# Patient Record
Sex: FEMALE | Race: WHITE | NOT HISPANIC OR LATINO | Employment: FULL TIME | URBAN - METROPOLITAN AREA
[De-identification: names, ages, dates, MRNs, and addresses within clinical notes are randomized per-mention and may not be internally consistent; named-entity substitution may affect disease eponyms.]

---

## 2017-02-08 ENCOUNTER — TRANSCRIBE ORDERS (OUTPATIENT)
Dept: ADMINISTRATIVE | Facility: HOSPITAL | Age: 47
End: 2017-02-08

## 2017-02-08 DIAGNOSIS — Z12.31 VISIT FOR SCREENING MAMMOGRAM: Primary | ICD-10-CM

## 2017-02-24 ENCOUNTER — HOSPITAL ENCOUNTER (OUTPATIENT)
Dept: MAMMOGRAPHY | Facility: HOSPITAL | Age: 47
Discharge: HOME | End: 2017-02-24
Admitting: NURSE PRACTITIONER

## 2017-02-24 DIAGNOSIS — Z12.31 VISIT FOR SCREENING MAMMOGRAM: ICD-10-CM

## 2017-02-24 PROCEDURE — 77063 BREAST TOMOSYNTHESIS BI: CPT

## 2017-02-24 PROCEDURE — 77063 BREAST TOMOSYNTHESIS BI: CPT | Performed by: RADIOLOGY

## 2017-02-24 PROCEDURE — G0202 SCR MAMMO BI INCL CAD: HCPCS

## 2017-02-24 PROCEDURE — 77067 SCR MAMMO BI INCL CAD: CPT | Performed by: RADIOLOGY

## 2017-03-10 ENCOUNTER — HOSPITAL ENCOUNTER (OUTPATIENT)
Dept: MAMMOGRAPHY | Facility: HOSPITAL | Age: 47
Discharge: HOME | End: 2017-03-10
Admitting: NURSE PRACTITIONER

## 2017-03-10 DIAGNOSIS — R92.8 ABNORMAL MAMMOGRAM: ICD-10-CM

## 2017-03-10 PROCEDURE — G0279 TOMOSYNTHESIS, MAMMO: HCPCS

## 2017-03-10 PROCEDURE — 77065 DX MAMMO INCL CAD UNI: CPT | Performed by: RADIOLOGY

## 2017-03-10 PROCEDURE — 77061 BREAST TOMOSYNTHESIS UNI: CPT | Performed by: RADIOLOGY

## 2017-03-10 PROCEDURE — G0206 DX MAMMO INCL CAD UNI: HCPCS

## 2017-03-17 ENCOUNTER — TRANSCRIBE ORDERS (OUTPATIENT)
Dept: MAMMOGRAPHY | Facility: HOSPITAL | Age: 47
End: 2017-03-17

## 2017-03-17 DIAGNOSIS — R92.8 ABNORMAL MAMMOGRAM: Primary | ICD-10-CM

## 2017-09-08 ENCOUNTER — APPOINTMENT (OUTPATIENT)
Dept: MAMMOGRAPHY | Facility: HOSPITAL | Age: 47
End: 2017-09-08

## 2017-09-08 ENCOUNTER — HOSPITAL ENCOUNTER (OUTPATIENT)
Dept: MAMMOGRAPHY | Facility: HOSPITAL | Age: 47
Discharge: HOME | End: 2017-09-08
Admitting: NURSE PRACTITIONER

## 2017-09-08 DIAGNOSIS — R92.8 ABNORMAL MAMMOGRAM: ICD-10-CM

## 2017-09-08 PROCEDURE — 77065 DX MAMMO INCL CAD UNI: CPT | Performed by: RADIOLOGY

## 2017-09-08 PROCEDURE — G0206 DX MAMMO INCL CAD UNI: HCPCS

## 2019-03-13 ENCOUNTER — TRANSCRIBE ORDERS (OUTPATIENT)
Dept: ADMINISTRATIVE | Facility: HOSPITAL | Age: 49
End: 2019-03-13

## 2019-03-13 DIAGNOSIS — Z12.31 VISIT FOR SCREENING MAMMOGRAM: Primary | ICD-10-CM

## 2019-04-25 ENCOUNTER — HOSPITAL ENCOUNTER (OUTPATIENT)
Dept: MAMMOGRAPHY | Facility: HOSPITAL | Age: 49
Discharge: HOME | End: 2019-04-25
Admitting: NURSE PRACTITIONER

## 2019-04-25 DIAGNOSIS — Z12.31 VISIT FOR SCREENING MAMMOGRAM: ICD-10-CM

## 2019-04-25 PROCEDURE — 77067 SCR MAMMO BI INCL CAD: CPT

## 2019-04-25 PROCEDURE — 77063 BREAST TOMOSYNTHESIS BI: CPT | Performed by: RADIOLOGY

## 2019-04-25 PROCEDURE — 77067 SCR MAMMO BI INCL CAD: CPT | Performed by: RADIOLOGY

## 2019-04-25 PROCEDURE — 77063 BREAST TOMOSYNTHESIS BI: CPT

## 2020-06-09 ENCOUNTER — TRANSCRIBE ORDERS (OUTPATIENT)
Dept: ADMINISTRATIVE | Facility: HOSPITAL | Age: 50
End: 2020-06-09

## 2020-06-09 DIAGNOSIS — Z12.31 VISIT FOR SCREENING MAMMOGRAM: Primary | ICD-10-CM

## 2020-08-04 ENCOUNTER — HOSPITAL ENCOUNTER (OUTPATIENT)
Dept: MAMMOGRAPHY | Facility: HOSPITAL | Age: 50
Discharge: HOME | End: 2020-08-04
Admitting: NURSE PRACTITIONER

## 2020-08-04 DIAGNOSIS — Z12.31 VISIT FOR SCREENING MAMMOGRAM: ICD-10-CM

## 2020-08-04 PROCEDURE — 77063 BREAST TOMOSYNTHESIS BI: CPT

## 2020-08-04 PROCEDURE — 77063 BREAST TOMOSYNTHESIS BI: CPT | Performed by: RADIOLOGY

## 2020-08-04 PROCEDURE — 77067 SCR MAMMO BI INCL CAD: CPT | Performed by: RADIOLOGY

## 2020-08-04 PROCEDURE — 77067 SCR MAMMO BI INCL CAD: CPT

## 2021-07-02 ENCOUNTER — TRANSCRIBE ORDERS (OUTPATIENT)
Dept: ADMINISTRATIVE | Facility: HOSPITAL | Age: 51
End: 2021-07-02

## 2021-07-02 DIAGNOSIS — Z12.31 VISIT FOR SCREENING MAMMOGRAM: Primary | ICD-10-CM

## 2021-08-06 ENCOUNTER — HOSPITAL ENCOUNTER (OUTPATIENT)
Dept: MAMMOGRAPHY | Facility: HOSPITAL | Age: 51
Discharge: HOME | End: 2021-08-06
Admitting: FAMILY MEDICINE

## 2021-08-06 DIAGNOSIS — Z12.31 VISIT FOR SCREENING MAMMOGRAM: ICD-10-CM

## 2021-08-06 PROCEDURE — 77063 BREAST TOMOSYNTHESIS BI: CPT

## 2021-08-06 PROCEDURE — 77067 SCR MAMMO BI INCL CAD: CPT

## 2021-08-06 PROCEDURE — 77063 BREAST TOMOSYNTHESIS BI: CPT | Performed by: RADIOLOGY

## 2021-08-06 PROCEDURE — 77067 SCR MAMMO BI INCL CAD: CPT | Performed by: RADIOLOGY

## 2022-08-17 ENCOUNTER — TRANSCRIBE ORDERS (OUTPATIENT)
Dept: ADMINISTRATIVE | Facility: HOSPITAL | Age: 52
End: 2022-08-17

## 2022-08-17 DIAGNOSIS — Z12.31 VISIT FOR SCREENING MAMMOGRAM: Primary | ICD-10-CM

## 2022-08-22 ENCOUNTER — HOSPITAL ENCOUNTER (OUTPATIENT)
Dept: MAMMOGRAPHY | Facility: HOSPITAL | Age: 52
Discharge: HOME | End: 2022-08-22
Admitting: NURSE PRACTITIONER

## 2022-08-22 DIAGNOSIS — Z12.31 VISIT FOR SCREENING MAMMOGRAM: ICD-10-CM

## 2022-08-22 PROCEDURE — 77067 SCR MAMMO BI INCL CAD: CPT | Performed by: RADIOLOGY

## 2022-08-22 PROCEDURE — 77063 BREAST TOMOSYNTHESIS BI: CPT | Performed by: RADIOLOGY

## 2022-08-22 PROCEDURE — 77063 BREAST TOMOSYNTHESIS BI: CPT

## 2022-08-22 PROCEDURE — 77067 SCR MAMMO BI INCL CAD: CPT

## 2022-09-02 ENCOUNTER — OFFICE VISIT (OUTPATIENT)
Dept: ENDOCRINOLOGY | Facility: CLINIC | Age: 52
End: 2022-09-02

## 2022-09-02 VITALS
WEIGHT: 176 LBS | HEART RATE: 71 BPM | DIASTOLIC BLOOD PRESSURE: 66 MMHG | HEIGHT: 66 IN | SYSTOLIC BLOOD PRESSURE: 110 MMHG | BODY MASS INDEX: 28.28 KG/M2 | OXYGEN SATURATION: 98 %

## 2022-09-02 DIAGNOSIS — Z90.711 HISTORY OF PARTIAL HYSTERECTOMY: ICD-10-CM

## 2022-09-02 DIAGNOSIS — E04.2 MULTIPLE THYROID NODULES: ICD-10-CM

## 2022-09-02 DIAGNOSIS — R53.82 CHRONIC FATIGUE: ICD-10-CM

## 2022-09-02 DIAGNOSIS — E03.9 ACQUIRED HYPOTHYROIDISM: Primary | Chronic | ICD-10-CM

## 2022-09-02 DIAGNOSIS — L65.9 HAIR LOSS: ICD-10-CM

## 2022-09-02 PROBLEM — E04.1 THYROID NODULE: Status: ACTIVE | Noted: 2022-09-02

## 2022-09-02 PROBLEM — E55.9 VITAMIN D DEFICIENCY: Status: ACTIVE | Noted: 2022-09-02

## 2022-09-02 PROBLEM — E04.9 GOITER: Status: ACTIVE | Noted: 2022-09-02

## 2022-09-02 PROCEDURE — 99204 OFFICE O/P NEW MOD 45 MIN: CPT | Performed by: PHYSICIAN ASSISTANT

## 2022-09-02 RX ORDER — PROPRANOLOL HCL 60 MG
60 CAPSULE, EXTENDED RELEASE 24HR ORAL DAILY
COMMUNITY
Start: 2022-08-08

## 2022-09-02 RX ORDER — LEVOTHYROXINE SODIUM 0.03 MG/1
25 TABLET ORAL DAILY
COMMUNITY
Start: 2022-08-02 | End: 2022-09-20 | Stop reason: SDUPTHER

## 2022-09-02 RX ORDER — BUSPIRONE HYDROCHLORIDE 7.5 MG/1
7.5 TABLET ORAL 2 TIMES DAILY
COMMUNITY
Start: 2022-08-02 | End: 2023-02-08 | Stop reason: DRUGHIGH

## 2022-09-02 RX ORDER — ESCITALOPRAM OXALATE 20 MG/1
20 TABLET ORAL DAILY
COMMUNITY
Start: 2022-08-15

## 2022-09-02 RX ORDER — OMEPRAZOLE 40 MG/1
40 CAPSULE, DELAYED RELEASE ORAL 2 TIMES DAILY
COMMUNITY
Start: 2022-08-18

## 2022-09-02 RX ORDER — FLUTICASONE PROPIONATE 50 MCG
2 SPRAY, SUSPENSION (ML) NASAL DAILY
COMMUNITY
Start: 2022-08-02

## 2022-09-02 NOTE — PROGRESS NOTES
Chief Complaint:   Adelaida Bolton is a 52 y.o. female who is being seen today for  Hypothyroidism . Referred by Alison Ibanez APRN     HPI     52 y.o. female comes in for further management of hypothyroidism.     Diagnosed: 2000  Medication: levothyroxine 25 mcg daily - on this only for last 7 weeks. Was taking the medication at night, 2 hours after eating. Now taking in the morning, on empty stomach, waits 30 min before taking omeprazole. Drinks coffee soon after thyroid medicine.   Previous medication: cytomel (liothyronine) 25 mcg - stopped 7 weeks ago (felt very anxious and on edge when she took this)  Hx of radiation to head/neck: no  Family hx of thyroid cancer: no    TFTs have not been stable for a long time.   Was taken off cytomel 25 mcg about 7 weeks ago and overall feels much better since then. Felt very anxious and on edge when she took that. Fatigue was also worse.  Still feels some fatigue, especially in the afternoon, but overall better.  Struggles with weight gain, dry skin, some hair loss. Some constipations. Had more diarrhea when on cytomel. Sometimes has trouble with word finding.   Is on biotin supplement.   Hx of multiple thyroid nodules. Had u/s and thyroid scan in 2020. Thinks [facility] may have records.    The following portions of the patient's history were reviewed and updated as appropriate: allergies, current medications, past family history, past medical history, past social history, past surgical history and problem list.    Review of Systems  Review of Systems   Constitutional: Positive for fatigue and unexpected weight change (weight gain).   Skin:        Dry skin, hair loss   All other systems reviewed and are negative.       Current medications:  Current Outpatient Medications   Medication Sig Dispense Refill   • busPIRone (BUSPAR) 7.5 MG tablet Take 7.5 mg by mouth 2 (Two) Times a Day.     • escitalopram (LEXAPRO) 20 MG tablet Take 20 mg by mouth Daily.     •  fluticasone (FLONASE) 50 MCG/ACT nasal spray 2 sprays by Each Nare route Daily.     • levothyroxine (SYNTHROID, LEVOTHROID) 25 MCG tablet Take 25 mcg by mouth Daily.     • omeprazole (priLOSEC) 40 MG capsule Take 40 mg by mouth 2 (Two) Times a Day.     • propranolol LA (INDERAL LA) 60 MG 24 hr capsule Take 60 mg by mouth Daily.       No current facility-administered medications for this visit.       Physical Exam   Vitals:    09/02/22 0800   BP: 110/66   Pulse: 71   SpO2: 98%   Body mass index is 28.41 kg/m².  Physical Exam  Constitutional:       General: She is not in acute distress.     Appearance: She is well-developed. She is not diaphoretic.   HENT:      Head: Normocephalic.      Right Ear: External ear normal.      Left Ear: External ear normal.   Eyes:      General: Lids are normal.         Right eye: No discharge.         Left eye: No discharge.      Conjunctiva/sclera: Conjunctivae normal.   Neck:      Thyroid: No thyroid mass or thyromegaly.      Vascular: No JVD.      Trachea: No tracheal deviation.      Comments: Right thyroid nodule  Cardiovascular:      Rate and Rhythm: Normal rate and regular rhythm.      Heart sounds: Normal heart sounds. No murmur heard.  Pulmonary:      Effort: Pulmonary effort is normal. No respiratory distress.      Breath sounds: Normal breath sounds. No wheezing.   Musculoskeletal:         General: No tenderness.   Lymphadenopathy:      Cervical: No cervical adenopathy.   Skin:     General: Skin is warm and dry.      Findings: No erythema or rash.   Neurological:      Mental Status: She is alert and oriented to person, place, and time.   Psychiatric:         Speech: Speech normal.         Behavior: Behavior normal.         Thought Content: Thought content normal.         Labs and Imaging   No results found for: TSH, J7HXFWX, E8ILECE, THYROIDAB       No labs available for review    Assessment / Plan     Diagnoses and all orders for this visit:    1. Acquired hypothyroidism  (Primary)  -     TSH; Future  -     T4, Free; Future    2. History of partial hysterectomy  -     Follicle Stimulating Hormone; Future    3. Hair loss    4. Multiple thyroid nodules  -     US Thyroid; Future    5. Chronic fatigue  -     TSH; Future  -     T4, Free; Future  -     CBC & Differential; Future  -     Ferritin; Future  -     Vitamin D 25 Hydroxy; Future  -     Vitamin B12; Future  -     Comprehensive Metabolic Panel; Future        Problem List Items Addressed This Visit        Other    Acquired hypothyroidism - Primary (Chronic)    Relevant Medications    propranolol LA (INDERAL LA) 60 MG 24 hr capsule    levothyroxine (SYNTHROID, LEVOTHROID) 25 MCG tablet    Other Relevant Orders    TSH    T4, Free      Other Visit Diagnoses     History of partial hysterectomy        Relevant Orders    Follicle Stimulating Hormone    Hair loss        Multiple thyroid nodules        Relevant Medications    propranolol LA (INDERAL LA) 60 MG 24 hr capsule    levothyroxine (SYNTHROID, LEVOTHROID) 25 MCG tablet    Other Relevant Orders    US Thyroid    Chronic fatigue        Relevant Orders    TSH    T4, Free    CBC & Differential    Ferritin    Vitamin D 25 Hydroxy    Vitamin B12    Comprehensive Metabolic Panel        Diagnosis was discussed and reviewed with the patient including the advantages of drug therapy.        1. Patient appears clinically hypothyroid. She will hold biotin x 1 week and repeat TFTs. Levothyroxine will be adjusted as needed and may consider changing to brand name Synthroid. She feels much better off Cytomel as she was on a very large dose. If fatigue continues to be an issue could consider trying a much lower dose, such as 5 mcg daily. Will also check vit d, b12, ferritin with chronic fatigue. Plan to obtain thyroid u/s and scan records. Repeat u/s to check stability of nodules.       We have discussed method of administration of levothyroxine and medication interactions.  I recommended taking the  medication on an empty stomach in the morning or at bedtime, at least 30 minutes prior to intake of food or hot drinks and 4 hours apart from calcium or iron supplements. If a dose is missed patient can take two the following day.   2. Patient will return to our office in 3 months.   3. The risks and benefits of my recommendations, as well as other treatment options were discussed with the patient today. Questions were answered.      Ismael Trevizo PA-C  Endocrinology

## 2022-09-15 ENCOUNTER — HOSPITAL ENCOUNTER (OUTPATIENT)
Dept: ULTRASOUND IMAGING | Facility: HOSPITAL | Age: 52
Discharge: HOME | End: 2022-09-15
Admitting: PHYSICIAN ASSISTANT

## 2022-09-15 DIAGNOSIS — E04.2 MULTIPLE THYROID NODULES: ICD-10-CM

## 2022-09-15 PROCEDURE — 76536 US EXAM OF HEAD AND NECK: CPT

## 2022-09-19 ENCOUNTER — PATIENT MESSAGE (OUTPATIENT)
Dept: ENDOCRINOLOGY | Facility: CLINIC | Age: 52
End: 2022-09-19

## 2022-09-19 NOTE — TELEPHONE ENCOUNTER
Spoke with patient and she verbalized understanding. She is getting scheduled for a thyroid u/s with Dr. Huddleston now.     Pt is also wanting to know if Ismael wanted to adjust her Levothyroxin dose to help with her drowsiness and fatigue. Told her I would send a message and see what she thinks then give her a call back. Pt verbalized understanding.

## 2022-09-20 ENCOUNTER — TELEPHONE (OUTPATIENT)
Dept: ENDOCRINOLOGY | Facility: CLINIC | Age: 52
End: 2022-09-20

## 2022-09-20 RX ORDER — LEVOTHYROXINE SODIUM 0.05 MG/1
50 TABLET ORAL DAILY
Qty: 90 TABLET | Refills: 0 | Status: SHIPPED | OUTPATIENT
Start: 2022-09-20 | End: 2022-11-18

## 2022-09-20 NOTE — TELEPHONE ENCOUNTER
Spoke with patient and she verbalized understanding. She said she is not on any hormone replacements. She did have a partial hysterectomy she said.    She also wants to know if the increase of levothyroxine would help her stomach issues as well. I told her I was not sure but I would see.

## 2022-09-20 NOTE — TELEPHONE ENCOUNTER
Please call pt.  Received lab results and reviewed them.     White blood cells were a bit elevated. Possibly due to recent infection.   Kidney function and liver enzymes are normal.   Thyroid labs are normal. We can try to increase levothyroxine to 50 mcg daily to see if she feels any better on that dose. Rx sent. We'll repeat labs again at next visit.   Ferritin, storage form of iron, is normal.   B12 is normal.   FSH, follicle stimulating hormone, is 14.4, so still not in the menopause range. I don't see that she is on any hormones (hormone replacement therapy), if she was it would affect that lab result.

## 2022-09-21 NOTE — TELEPHONE ENCOUNTER
Sounds like ovaries were left intact and per labs seem to be still making some hormone.     I doubt the levothyroxine will help with the stomach issues but may help with her fatigue.

## 2022-11-18 RX ORDER — LEVOTHYROXINE SODIUM 0.05 MG/1
50 TABLET ORAL DAILY
Qty: 30 TABLET | Refills: 1 | Status: SHIPPED | OUTPATIENT
Start: 2022-11-18 | End: 2023-03-10

## 2022-12-06 ENCOUNTER — LAB (OUTPATIENT)
Dept: LAB | Facility: HOSPITAL | Age: 52
End: 2022-12-06

## 2022-12-06 ENCOUNTER — OFFICE VISIT (OUTPATIENT)
Dept: ENDOCRINOLOGY | Facility: CLINIC | Age: 52
End: 2022-12-06

## 2022-12-06 VITALS
OXYGEN SATURATION: 96 % | DIASTOLIC BLOOD PRESSURE: 68 MMHG | WEIGHT: 178 LBS | SYSTOLIC BLOOD PRESSURE: 100 MMHG | HEART RATE: 70 BPM | BODY MASS INDEX: 28.61 KG/M2 | HEIGHT: 66 IN

## 2022-12-06 DIAGNOSIS — E04.1 THYROID NODULE: Chronic | ICD-10-CM

## 2022-12-06 DIAGNOSIS — E03.9 ACQUIRED HYPOTHYROIDISM: Primary | Chronic | ICD-10-CM

## 2022-12-06 LAB
T4 FREE SERPL-MCNC: 1.36 NG/DL (ref 0.93–1.7)
TSH SERPL DL<=0.05 MIU/L-ACNC: 0.58 UIU/ML (ref 0.27–4.2)

## 2022-12-06 PROCEDURE — 84439 ASSAY OF FREE THYROXINE: CPT | Performed by: PHYSICIAN ASSISTANT

## 2022-12-06 PROCEDURE — 99214 OFFICE O/P EST MOD 30 MIN: CPT | Performed by: PHYSICIAN ASSISTANT

## 2022-12-06 PROCEDURE — 84443 ASSAY THYROID STIM HORMONE: CPT | Performed by: PHYSICIAN ASSISTANT

## 2022-12-06 NOTE — PROGRESS NOTES
Chief Complaint:   Adelaida Bolton is a 52 y.o. female who is being seen today for  Hypothyroidism .     HPI     52 y.o. female comes in for f/u  of hypothyroidism.     Diagnosed: 2000  Medication: levothyroxine 50 mcg daily   Previous medication: cytomel (liothyronine) 25 mcg (felt very anxious and on edge when she took this)    Levothyroxine dose was increased to 50 mcg daily last week. She feels better on the higher dose. Has more energy. She is taking the medication on an empty stomach in the middle of the night now.  Otherwise doing fairly well without new symptoms.  She has chronic migraines.   Weight is stable.    Hx of multiple thyroid nodules. I attempted to obtain thyroid ultrasound records from 2020 but was unsuccessful.  Thyroid ultrasound was repeated 9/15/2022 noting 1.5 cm TI-RADS 3 nodule in the right lobe.  She has a follow-up with Dr. Huddleston 2/2023 to repeat.  She did have thyroid uptake and scan January 2021 which was normal without evidence of cold nodules. No fam hx of thyroid cancer.     The following portions of the patient's history were reviewed and updated by me as appropriate: allergies, current medications, past family history, past social history, past surgical history and problem list.    Review of Systems  Review of Systems   All other systems reviewed and are negative.       Current medications:  Current Outpatient Medications   Medication Sig Dispense Refill   • busPIRone (BUSPAR) 7.5 MG tablet Take 7.5 mg by mouth 2 (Two) Times a Day.     • escitalopram (LEXAPRO) 20 MG tablet Take 20 mg by mouth Daily.     • fluticasone (FLONASE) 50 MCG/ACT nasal spray 2 sprays by Each Nare route Daily.     • levothyroxine (SYNTHROID, LEVOTHROID) 50 MCG tablet TAKE 1 TABLET BY MOUTH DAILY. DOSE INCREASE 30 tablet 1   • omeprazole (priLOSEC) 40 MG capsule Take 40 mg by mouth 2 (Two) Times a Day.     • propranolol LA (INDERAL LA) 60 MG 24 hr capsule Take 60 mg by mouth Daily.       No current  facility-administered medications for this visit.       Physical Exam   Vitals:    12/06/22 0904   BP: 100/68   Pulse: 70   SpO2: 96%   Body mass index is 28.73 kg/m².  Physical Exam  Constitutional:       General: She is not in acute distress.     Appearance: She is well-developed. She is not diaphoretic.   HENT:      Head: Normocephalic.      Right Ear: External ear normal.      Left Ear: External ear normal.   Eyes:      General: Lids are normal.         Right eye: No discharge.         Left eye: No discharge.      Conjunctiva/sclera: Conjunctivae normal.   Neck:      Thyroid: No thyroid mass or thyromegaly.      Vascular: No JVD.      Trachea: No tracheal deviation.      Comments: Right thyroid nodule  Cardiovascular:      Rate and Rhythm: Normal rate and regular rhythm.      Heart sounds: Normal heart sounds. No murmur heard.  Pulmonary:      Effort: Pulmonary effort is normal. No respiratory distress.      Breath sounds: Normal breath sounds. No wheezing.   Musculoskeletal:         General: No tenderness.   Lymphadenopathy:      Cervical: No cervical adenopathy.   Skin:     General: Skin is warm and dry.      Findings: No erythema or rash.   Neurological:      Mental Status: She is alert and oriented to person, place, and time.   Psychiatric:         Speech: Speech normal.         Behavior: Behavior normal.         Thought Content: Thought content normal.         Labs and Imaging   No results found for: TSH, M5QYGIJ, V7XBFEF, THYROIDAB    External labs 9/23/2022 reviewed: TSH 1.22, free T4 0.87     US Thyroid (09/15/2022 15:32)    Assessment / Plan     Diagnoses and all orders for this visit:    1. Acquired hypothyroidism (Primary)  -     TSH  -     T4, Free    2. Thyroid nodule        Problem List Items Addressed This Visit        Other    Acquired hypothyroidism - Primary (Chronic)    Relevant Orders    TSH    T4, Free    Thyroid nodule    Overview     multinodular          Diagnosis was discussed and  reviewed with the patient including the advantages of drug therapy.        1. Patient appears clinically euthyroid.  Continue levothyroxine 50 mcg daily.  Repeat TFTs.  She has a follow-up with Dr. Huddleston 2/2023 to repeat thyroid ultrasound.  Pt will try to obtain previous thyroid u/s records for comparison.   2. Patient will return to our office in 3 months.   3. The risks and benefits of my recommendations, as well as other treatment options were discussed with the patient today. Questions were answered.      Ismael Trevizo PA-C  Endocrinology

## 2023-02-08 ENCOUNTER — OFFICE VISIT (OUTPATIENT)
Dept: ENDOCRINOLOGY | Facility: CLINIC | Age: 53
End: 2023-02-08
Payer: COMMERCIAL

## 2023-02-08 VITALS
HEIGHT: 66 IN | OXYGEN SATURATION: 98 % | DIASTOLIC BLOOD PRESSURE: 72 MMHG | HEART RATE: 74 BPM | BODY MASS INDEX: 29.14 KG/M2 | SYSTOLIC BLOOD PRESSURE: 110 MMHG | WEIGHT: 181.3 LBS

## 2023-02-08 DIAGNOSIS — E04.9 GOITER: Primary | ICD-10-CM

## 2023-02-08 DIAGNOSIS — E04.1 THYROID NODULE: ICD-10-CM

## 2023-02-08 DIAGNOSIS — E03.9 ACQUIRED HYPOTHYROIDISM: Chronic | ICD-10-CM

## 2023-02-08 DIAGNOSIS — E66.09 CLASS 1 OBESITY DUE TO EXCESS CALORIES WITHOUT SERIOUS COMORBIDITY WITH BODY MASS INDEX (BMI) OF 30.0 TO 30.9 IN ADULT: ICD-10-CM

## 2023-02-08 PROCEDURE — 99214 OFFICE O/P EST MOD 30 MIN: CPT | Performed by: INTERNAL MEDICINE

## 2023-02-08 PROCEDURE — 76536 US EXAM OF HEAD AND NECK: CPT | Performed by: INTERNAL MEDICINE

## 2023-02-08 RX ORDER — BUSPIRONE HYDROCHLORIDE 10 MG/1
1 TABLET ORAL EVERY 12 HOURS SCHEDULED
COMMUNITY
Start: 2023-01-26

## 2023-02-08 RX ORDER — SUMATRIPTAN 50 MG/1
TABLET, FILM COATED ORAL
COMMUNITY
Start: 2022-12-27

## 2023-02-08 RX ORDER — SEMAGLUTIDE 0.25 MG/.5ML
0.25 INJECTION, SOLUTION SUBCUTANEOUS WEEKLY
Qty: 2 ML | Refills: 0 | Status: SHIPPED | OUTPATIENT
Start: 2023-02-08

## 2023-02-08 NOTE — PROGRESS NOTES
"Goiter (Follow up & U/S)    Subjective   Adelaida Bolton is a 52 y.o. female. she is here today for follow-up for evaluation of   Thyroid nodule dx in 09/2022. Patient had previous u.s in another facility. U/s report and images reviewed - the largest nodule was 1.5 cm in size.     Patient denies having any complaints, she is taking levothyroxine 50 mcg for hypothyroidism and TSh was normal 12/6/22.     Patient c/o weight gain despite following the diet.       Medications:    Current Outpatient Medications:   •  busPIRone (BUSPAR) 10 MG tablet, Take 1 tablet by mouth Every 12 (Twelve) Hours., Disp: , Rfl:   •  escitalopram (LEXAPRO) 20 MG tablet, Take 20 mg by mouth Daily., Disp: , Rfl:   •  fluticasone (FLONASE) 50 MCG/ACT nasal spray, 2 sprays by Each Nare route Daily., Disp: , Rfl:   •  levothyroxine (SYNTHROID, LEVOTHROID) 50 MCG tablet, TAKE 1 TABLET BY MOUTH DAILY. DOSE INCREASE, Disp: 30 tablet, Rfl: 1  •  omeprazole (priLOSEC) 40 MG capsule, Take 40 mg by mouth 2 (Two) Times a Day., Disp: , Rfl:   •  propranolol LA (INDERAL LA) 60 MG 24 hr capsule, Take 60 mg by mouth Daily., Disp: , Rfl:   •  SUMAtriptan (IMITREX) 50 MG tablet, TAKE ONE TABLET BY MOUTH AS DIRECTED FOR HEADACHE, Disp: , Rfl:   •  Semaglutide-Weight Management (Wegovy) 0.25 MG/0.5ML solution auto-injector, Inject 0.25 mg under the skin into the appropriate area as directed 1 (One) Time Per Week., Disp: 2 mL, Rfl: 0      Review of Systems   Constitutional: Positive for unexpected weight gain.       Objective   Vitals:    02/08/23 0821   BP: 110/72   Pulse: 74   SpO2: 98%   Weight: 82.2 kg (181 lb 4.8 oz)   Height: 167.6 cm (66\")    Body mass index is 29.26 kg/m².   Physical Exam  Vitals reviewed.   Constitutional:       Appearance: Normal appearance.   Cardiovascular:      Rate and Rhythm: Normal rate and regular rhythm.      Pulses: Normal pulses.      Heart sounds: Normal heart sounds.   Pulmonary:      Effort: Pulmonary effort is normal.      " Breath sounds: Normal breath sounds.   Neurological:      Mental Status: She is alert and oriented to person, place, and time.   Psychiatric:         Mood and Affect: Mood normal.         Thought Content: Thought content normal.           Results for orders placed or performed in visit on 12/06/22   TSH    Specimen: Blood   Result Value Ref Range    TSH 0.578 0.270 - 4.200 uIU/mL   T4, Free    Specimen: Blood   Result Value Ref Range    Free T4 1.36 0.93 - 1.70 ng/dL             Thyroid ultrasound 02/08/23            Real time high resolution imaging of the thyroid gland was performed in transverse and longitudinal planes.   Previous images were reviewed and compared to the current appearance to assess stability.       The right lobe measured 4.45 cm L x 1.24 cm AP x 1.77 cm in TV dimension.    The isthmus measured 0.18 cm in thickness.    The left thyroid lobe measured 4.26 cm L x 1.07 cm AP x 1.4 cm in TV dimension.    Thyroid gland is heterogeneous and contains multiple nodules.    Nodule 1   is located in the right mid lobe and measures 0.61 x 0.3 x 0.51 cm (L X AP X TV).  This nodule is cystic, homogeneous, with well-defined margins, and Grade I vascularity on Color Flow Doppler.      Nodule 2  is located in the right mid lobe and measures 0.55 x 0.38 x 0.56 cm (L X AP X TV).  This nodule is cystic with well-defined margins, and Grade I vascularity on Color Flow Doppler.      Nodule 3   is located in the right lower lobe and measures 1.17 x 0.73 x 0.94 cm (L X AP X TV).  This nodule is solid, homogeneous, hypoechoic with irregular margins, and Grade II vascularity on Color Flow Doppler.  It has no artifacts    Nodule 4   is located in the left mid lobe and measures 0.36 x 0.3 x 0.34 cm (L X AP X TV).  This nodule is cystic with well-defined margins, and Grade I vascularity on Color Flow Doppler.  It has no artifacts     No pathologic lymph nodes were seen.      Assessment: Multiple small bilateral cystic  nodules less than 5- 6 mm in size.  The largest nodule is located in the right lobe inferiorly and represents a 1.17 x 0.94 cm solid nodule.  There is no indication for FNA at this time  Repeat ultrasound in 12 months      Assessment/Plan:    Problem List Items Addressed This Visit        Other    Goiter - Primary    Relevant Orders    US Thyroid (Completed)    Thyroid nodule    Overview     multinodular         Relevant Orders    US Thyroid (Completed)   Other Visit Diagnoses     Class 1 obesity due to excess calories without serious comorbidity with body mass index (BMI) of 30.0 to 30.9 in adult              Old records were reviewed and summarized in HPI section of the note.  Previous ultrasound report was reviewed and compared to current finding.   Thyroid nodules are small and no indications for FNA.     Weight loss management reviewed. Continue with dietary modifications. Discussed current available options. Wegovy appears to be covered by her insurance. I have explained side effects and reviewed titration instructions, instructions on administration provided.   Start at 0.25 mg weekly and increase to 0.5 mg weekly in 1 month. She is following with [facility] in 6 weeks.     Return in about 1 year (around 2/8/2024) for ultrasound.

## 2023-03-10 RX ORDER — LEVOTHYROXINE SODIUM 0.05 MG/1
TABLET ORAL
Qty: 90 TABLET | Refills: 1 | Status: SHIPPED | OUTPATIENT
Start: 2023-03-10

## 2023-03-15 ENCOUNTER — TELEPHONE (OUTPATIENT)
Dept: ENDOCRINOLOGY | Facility: CLINIC | Age: 53
End: 2023-03-15
Payer: COMMERCIAL

## 2023-03-15 NOTE — TELEPHONE ENCOUNTER
Patient called office, she is wanting Ismael to place lab orders to check her thyroid before she comes in for an appointment on 4/7. She wants them mailed to her so she can have labs done closer to home. Thank you.

## 2023-03-17 DIAGNOSIS — E03.9 ACQUIRED HYPOTHYROIDISM: Primary | ICD-10-CM

## 2023-07-13 ENCOUNTER — PATIENT MESSAGE (OUTPATIENT)
Dept: ENDOCRINOLOGY | Facility: CLINIC | Age: 53
End: 2023-07-13
Payer: COMMERCIAL

## 2023-07-27 ENCOUNTER — TRANSCRIBE ORDERS (OUTPATIENT)
Dept: ADMINISTRATIVE | Facility: HOSPITAL | Age: 53
End: 2023-07-27
Payer: COMMERCIAL

## 2023-07-27 DIAGNOSIS — E03.9 ACQUIRED HYPOTHYROIDISM: ICD-10-CM

## 2023-07-27 DIAGNOSIS — Z12.31 ENCOUNTER FOR SCREENING MAMMOGRAM FOR BREAST CANCER: Primary | ICD-10-CM

## 2023-08-23 ENCOUNTER — HOSPITAL ENCOUNTER (OUTPATIENT)
Dept: MAMMOGRAPHY | Facility: HOSPITAL | Age: 53
Discharge: HOME | End: 2023-08-23
Admitting: NURSE PRACTITIONER
Payer: COMMERCIAL

## 2023-08-23 DIAGNOSIS — Z12.31 ENCOUNTER FOR SCREENING MAMMOGRAM FOR BREAST CANCER: ICD-10-CM

## 2023-08-23 PROCEDURE — 77063 BREAST TOMOSYNTHESIS BI: CPT

## 2023-08-23 PROCEDURE — 77067 SCR MAMMO BI INCL CAD: CPT

## 2023-09-05 RX ORDER — LEVOTHYROXINE SODIUM 0.05 MG/1
TABLET ORAL
Qty: 30 TABLET | Refills: 2 | Status: SHIPPED | OUTPATIENT
Start: 2023-09-05

## 2023-11-13 DIAGNOSIS — E03.9 ACQUIRED HYPOTHYROIDISM: Primary | ICD-10-CM

## 2023-11-14 DIAGNOSIS — E03.9 ACQUIRED HYPOTHYROIDISM: ICD-10-CM

## 2023-12-04 NOTE — TELEPHONE ENCOUNTER
Rx Refill Note  Requested Prescriptions     Pending Prescriptions Disp Refills    levothyroxine (SYNTHROID, LEVOTHROID) 50 MCG tablet [Pharmacy Med Name: LEVOTHYROXINE 50 MCG TAB 50 Tablet] 30 tablet 12     Sig: TAKE ONE TABLET BY MOUTH EVERY DAY **DOSE INCREASE**        Last office visit with prescribing clinician: 2/8/2023      Next office visit with prescribing clinician: Visit date not found       Anjelica Palomares (Jodi)  12/04/23, 11:07 EST

## 2023-12-05 RX ORDER — LEVOTHYROXINE SODIUM 0.05 MG/1
TABLET ORAL
Qty: 30 TABLET | Refills: 2 | Status: SHIPPED | OUTPATIENT
Start: 2023-12-05

## 2024-06-07 RX ORDER — LEVOTHYROXINE SODIUM 0.05 MG/1
TABLET ORAL
Qty: 30 TABLET | Refills: 12 | OUTPATIENT
Start: 2024-06-07

## 2024-06-07 NOTE — TELEPHONE ENCOUNTER
Rx Refill Note  Requested Prescriptions     Pending Prescriptions Disp Refills    levothyroxine (SYNTHROID, LEVOTHROID) 50 MCG tablet [Pharmacy Med Name: LEVOTHYROXINE 50 MCG TAB 50 Tablet] 30 tablet 12     Sig: TAKE ONE TABLET BY MOUTH EVERY DAY **DOSE INCREASE**      Last office visit with prescribing clinician: 2/8/2023     Next office visit with prescribing clinician: Visit date not found

## 2024-07-09 ENCOUNTER — TRANSCRIBE ORDERS (OUTPATIENT)
Dept: ADMINISTRATIVE | Facility: HOSPITAL | Age: 54
End: 2024-07-09
Payer: COMMERCIAL

## 2024-07-09 DIAGNOSIS — Z12.31 VISIT FOR SCREENING MAMMOGRAM: Primary | ICD-10-CM

## 2024-08-29 ENCOUNTER — HOSPITAL ENCOUNTER (OUTPATIENT)
Dept: MAMMOGRAPHY | Facility: HOSPITAL | Age: 54
Discharge: HOME | End: 2024-08-29
Admitting: NURSE PRACTITIONER
Payer: COMMERCIAL

## 2024-08-29 DIAGNOSIS — Z12.31 VISIT FOR SCREENING MAMMOGRAM: ICD-10-CM

## 2024-08-29 PROCEDURE — 77063 BREAST TOMOSYNTHESIS BI: CPT

## 2024-08-29 PROCEDURE — 77067 SCR MAMMO BI INCL CAD: CPT

## 2025-07-15 ENCOUNTER — TRANSCRIBE ORDERS (OUTPATIENT)
Dept: ADMINISTRATIVE | Facility: HOSPITAL | Age: 55
End: 2025-07-15
Payer: COMMERCIAL

## 2025-07-15 DIAGNOSIS — Z12.31 SCREENING MAMMOGRAM FOR BREAST CANCER: Primary | ICD-10-CM
